# Patient Record
Sex: MALE | Employment: FULL TIME | URBAN - NONMETROPOLITAN AREA
[De-identification: names, ages, dates, MRNs, and addresses within clinical notes are randomized per-mention and may not be internally consistent; named-entity substitution may affect disease eponyms.]

---

## 2020-04-07 ENCOUNTER — APPOINTMENT (OUTPATIENT)
Dept: GENERAL RADIOLOGY | Facility: OTHER | Age: 40
End: 2020-04-07
Attending: PHYSICIAN ASSISTANT
Payer: COMMERCIAL

## 2020-04-07 ENCOUNTER — HOSPITAL ENCOUNTER (EMERGENCY)
Facility: OTHER | Age: 40
Discharge: HOME | End: 2020-04-07
Attending: PHYSICIAN ASSISTANT | Admitting: PHYSICIAN ASSISTANT
Payer: COMMERCIAL

## 2020-04-07 VITALS
TEMPERATURE: 98.1 F | BODY MASS INDEX: 26.37 KG/M2 | RESPIRATION RATE: 20 BRPM | SYSTOLIC BLOOD PRESSURE: 128 MMHG | HEART RATE: 94 BPM | HEIGHT: 67 IN | WEIGHT: 168 LBS | OXYGEN SATURATION: 98 % | DIASTOLIC BLOOD PRESSURE: 89 MMHG

## 2020-04-07 DIAGNOSIS — G44.1 VASCULAR HEADACHE: ICD-10-CM

## 2020-04-07 DIAGNOSIS — I10 HTN (HYPERTENSION): ICD-10-CM

## 2020-04-07 LAB
ALBUMIN SERPL-MCNC: 4.6 G/DL (ref 3.5–5.7)
ALP SERPL-CCNC: 52 U/L (ref 34–104)
ALT SERPL W P-5'-P-CCNC: 40 U/L (ref 7–52)
ANION GAP SERPL CALCULATED.3IONS-SCNC: 6 MMOL/L (ref 3–14)
AST SERPL W P-5'-P-CCNC: 24 U/L (ref 13–39)
BASOPHILS # BLD AUTO: 0 10E9/L (ref 0–0.2)
BASOPHILS NFR BLD AUTO: 0.3 %
BILIRUB SERPL-MCNC: 1.1 MG/DL (ref 0.3–1)
BUN SERPL-MCNC: 14 MG/DL (ref 7–25)
CALCIUM SERPL-MCNC: 10 MG/DL (ref 8.6–10.3)
CHLORIDE SERPL-SCNC: 103 MMOL/L (ref 98–107)
CO2 SERPL-SCNC: 27 MMOL/L (ref 21–31)
CREAT SERPL-MCNC: 1.05 MG/DL (ref 0.7–1.3)
D DIMER PPP DDU-MCNC: <200 NG/ML D-DU (ref 0–230)
DIFFERENTIAL METHOD BLD: NORMAL
EOSINOPHIL # BLD AUTO: 0.1 10E9/L (ref 0–0.7)
EOSINOPHIL NFR BLD AUTO: 0.7 %
ERYTHROCYTE [DISTWIDTH] IN BLOOD BY AUTOMATED COUNT: 12.7 % (ref 10–15)
GFR SERPL CREATININE-BSD FRML MDRD: 78 ML/MIN/{1.73_M2}
GLUCOSE SERPL-MCNC: 101 MG/DL (ref 70–105)
HCT VFR BLD AUTO: 44.2 % (ref 40–53)
HGB BLD-MCNC: 14.5 G/DL (ref 13.3–17.7)
IMM GRANULOCYTES # BLD: 0 10E9/L (ref 0–0.4)
IMM GRANULOCYTES NFR BLD: 0.4 %
INR PPP: 1.05 (ref 0–1.3)
LYMPHOCYTES # BLD AUTO: 1.1 10E9/L (ref 0.8–5.3)
LYMPHOCYTES NFR BLD AUTO: 11.9 %
MCH RBC QN AUTO: 29.1 PG (ref 26.5–33)
MCHC RBC AUTO-ENTMCNC: 32.8 G/DL (ref 31.5–36.5)
MCV RBC AUTO: 89 FL (ref 78–100)
MONOCYTES # BLD AUTO: 0.6 10E9/L (ref 0–1.3)
MONOCYTES NFR BLD AUTO: 6.2 %
NEUTROPHILS # BLD AUTO: 7.6 10E9/L (ref 1.6–8.3)
NEUTROPHILS NFR BLD AUTO: 80.5 %
NT-PROBNP SERPL-MCNC: 11 PG/ML (ref 0–100)
PLATELET # BLD AUTO: 267 10E9/L (ref 150–450)
POTASSIUM SERPL-SCNC: 3.7 MMOL/L (ref 3.5–5.1)
PROT SERPL-MCNC: 7.3 G/DL (ref 6.4–8.9)
RBC # BLD AUTO: 4.98 10E12/L (ref 4.4–5.9)
SODIUM SERPL-SCNC: 136 MMOL/L (ref 134–144)
TROPONIN I SERPL-MCNC: <2.3 PG/ML
TSH SERPL DL<=0.05 MIU/L-ACNC: 0.96 IU/ML (ref 0.34–5.6)
WBC # BLD AUTO: 9.5 10E9/L (ref 4–11)

## 2020-04-07 PROCEDURE — 84443 ASSAY THYROID STIM HORMONE: CPT | Performed by: PHYSICIAN ASSISTANT

## 2020-04-07 PROCEDURE — 80053 COMPREHEN METABOLIC PANEL: CPT | Performed by: PHYSICIAN ASSISTANT

## 2020-04-07 PROCEDURE — 85379 FIBRIN DEGRADATION QUANT: CPT | Performed by: PHYSICIAN ASSISTANT

## 2020-04-07 PROCEDURE — 93005 ELECTROCARDIOGRAM TRACING: CPT | Performed by: PHYSICIAN ASSISTANT

## 2020-04-07 PROCEDURE — 83880 ASSAY OF NATRIURETIC PEPTIDE: CPT | Performed by: PHYSICIAN ASSISTANT

## 2020-04-07 PROCEDURE — 84484 ASSAY OF TROPONIN QUANT: CPT | Performed by: PHYSICIAN ASSISTANT

## 2020-04-07 PROCEDURE — 93010 ELECTROCARDIOGRAM REPORT: CPT | Performed by: INTERNAL MEDICINE

## 2020-04-07 PROCEDURE — 71045 X-RAY EXAM CHEST 1 VIEW: CPT | Mod: TC

## 2020-04-07 PROCEDURE — 36415 COLL VENOUS BLD VENIPUNCTURE: CPT | Performed by: PHYSICIAN ASSISTANT

## 2020-04-07 PROCEDURE — 99285 EMERGENCY DEPT VISIT HI MDM: CPT | Mod: 25 | Performed by: PHYSICIAN ASSISTANT

## 2020-04-07 PROCEDURE — 25000132 ZZH RX MED GY IP 250 OP 250 PS 637: Performed by: PHYSICIAN ASSISTANT

## 2020-04-07 PROCEDURE — 85610 PROTHROMBIN TIME: CPT | Performed by: PHYSICIAN ASSISTANT

## 2020-04-07 PROCEDURE — 99284 EMERGENCY DEPT VISIT MOD MDM: CPT | Mod: Z6 | Performed by: PHYSICIAN ASSISTANT

## 2020-04-07 PROCEDURE — 85025 COMPLETE CBC W/AUTO DIFF WBC: CPT | Performed by: PHYSICIAN ASSISTANT

## 2020-04-07 RX ORDER — LISINOPRIL 10 MG/1
10 TABLET ORAL ONCE
Status: COMPLETED | OUTPATIENT
Start: 2020-04-07 | End: 2020-04-07

## 2020-04-07 RX ORDER — RIZATRIPTAN BENZOATE 10 MG/1
10 TABLET, ORALLY DISINTEGRATING ORAL
Status: COMPLETED | OUTPATIENT
Start: 2020-04-07 | End: 2020-04-07

## 2020-04-07 RX ORDER — RIZATRIPTAN BENZOATE 10 MG/1
TABLET ORAL
Qty: 30 TABLET | Refills: 0 | Status: SHIPPED | OUTPATIENT
Start: 2020-04-07

## 2020-04-07 RX ORDER — LISINOPRIL 10 MG/1
10 TABLET ORAL DAILY
Qty: 30 TABLET | Refills: 0 | Status: SHIPPED | OUTPATIENT
Start: 2020-04-07 | End: 2020-05-07

## 2020-04-07 RX ADMIN — RIZATRIPTAN BENZOATE 10 MG: 10 TABLET, ORALLY DISINTEGRATING ORAL at 22:39

## 2020-04-07 RX ADMIN — LISINOPRIL 10 MG: 10 TABLET ORAL at 22:39

## 2020-04-07 SDOH — HEALTH STABILITY: MENTAL HEALTH: HOW OFTEN DO YOU HAVE A DRINK CONTAINING ALCOHOL?: NEVER

## 2020-04-07 ASSESSMENT — MIFFLIN-ST. JEOR: SCORE: 1630.67

## 2020-04-07 ASSESSMENT — ENCOUNTER SYMPTOMS
BACK PAIN: 0
SEIZURES: 0
CONSTIPATION: 0
WEAKNESS: 0
APPETITE CHANGE: 0
NECK STIFFNESS: 0
FACIAL ASYMMETRY: 0
SORE THROAT: 0
DYSURIA: 0
SPEECH DIFFICULTY: 0
RHINORRHEA: 0
FACIAL SWELLING: 0
NAUSEA: 0
FATIGUE: 0
LIGHT-HEADEDNESS: 0
ACTIVITY CHANGE: 0
NECK PAIN: 0
TROUBLE SWALLOWING: 0
WHEEZING: 0
DIARRHEA: 0
STRIDOR: 0
DIZZINESS: 0
CHEST TIGHTNESS: 0
FREQUENCY: 0
TREMORS: 0
VOMITING: 0
EYE PAIN: 0
HEADACHES: 1
COLOR CHANGE: 0
SHORTNESS OF BREATH: 0
FEVER: 0
COUGH: 0
ABDOMINAL PAIN: 0

## 2020-04-07 NOTE — ED AVS SNAPSHOT
[facility]  [address]  [place] [address]  Phone:  [phone]  Fax:  [phone]                                    Matias Tinsley   MRN: [id number]    Department:  [facility]   Date of Visit:  4/7/2020           After Visit Summary Signature Page    I have received my discharge instructions, and my questions have been answered. I have discussed any challenges I see with this plan with the nurse or doctor.    ..........................................................................................................................................  Patient/Patient Representative Signature      ..........................................................................................................................................  Patient Representative Print Name and Relationship to Patient    ..................................................               ................................................  Date                                   Time    ..........................................................................................................................................  Reviewed by Signature/Title    ...................................................              ..............................................  Date                                               Time          22EPIC Rev 08/18

## 2020-04-08 NOTE — ED TRIAGE NOTES
"Presents to the ER d/t  the last three days the patient reports \"unstable blood pressure\".  Reports dizziness and a headache.  Felt worse if he bent down.  Denies being on any blood pressure medicine.  Pt is here from [place] and has been working here for 4 weeks.  Pt reports allergies, being in the cold seems to affect his breathing more.  Does not speak english.  We have provided an .   "

## 2020-04-08 NOTE — ED PROVIDER NOTES
History     Chief Complaint   Patient presents with     Hypertension     HPI  Matias Tinsley is a 39 year old male who resides in [place] but commutes appear for work.  He has had 3-day history of worsening blood pressures as well as headaches.  He reports when monitoring his blood pressures they have been in the 150/90 range.  He denies any chest pain or shortness of breath.  No lightheadedness but some dizziness at times.  Denies any sore throat cough or flulike symptoms.  Denies any nausea or vomiting.  His usual doctor is in [place].  He is here with an telemetry  for further evaluation.    Allergies:  Allergies   Allergen Reactions     Seasonal Allergies        Problem List:    There are no active problems to display for this patient.       Past Medical History:    History reviewed. No pertinent past medical history.    Past Surgical History:    History reviewed. No pertinent surgical history.    Family History:    History reviewed. No pertinent family history.    Social History:  Marital Status:   [2]  Social History     Tobacco Use     Smoking status: Never Smoker     Smokeless tobacco: Never Used   Substance Use Topics     Alcohol use: Not Currently     Frequency: Never     Drug use: Never        Medications:    No current outpatient medications on file.        Review of Systems   Constitutional: Negative for activity change, appetite change, fatigue and fever.   HENT: Negative for drooling, facial swelling, rhinorrhea, sore throat and trouble swallowing.    Eyes: Negative for pain and visual disturbance.   Respiratory: Negative for cough, chest tightness, shortness of breath, wheezing and stridor.    Cardiovascular: Negative for chest pain and leg swelling.   Gastrointestinal: Negative for abdominal pain, constipation, diarrhea, nausea and vomiting.   Genitourinary: Negative for dysuria, frequency and urgency.   Musculoskeletal: Negative for back pain, neck pain and neck stiffness.   Skin:  "Negative for color change.   Neurological: Positive for headaches. Negative for dizziness, tremors, seizures, facial asymmetry, speech difficulty, weakness and light-headedness.       Physical Exam   BP: (!) 144/87  Pulse: 94  Heart Rate: 104  Temp: 98.1  F (36.7  C)  Resp: 20  Height: 170.2 cm (5' 7\")  Weight: 76.2 kg (168 lb)  SpO2: 98 %      Physical Exam  Constitutional:       General: He is not in acute distress.     Appearance: He is not ill-appearing, toxic-appearing or diaphoretic.   HENT:      Head: Atraumatic.      Right Ear: Tympanic membrane normal. No drainage or tenderness.      Left Ear: Tympanic membrane normal. No drainage or tenderness.      Nose: Nose normal. No rhinorrhea.   Eyes:      General: No scleral icterus.     Extraocular Movements: Extraocular movements intact.      Right eye: Normal extraocular motion and no nystagmus.      Left eye: Normal extraocular motion and no nystagmus.      Pupils: Pupils are equal, round, and reactive to light. Pupils are equal.      Funduscopic exam:     Right eye: No AV nicking or papilledema. Red reflex present.         Left eye: No AV nicking or papilledema. Red reflex present.  Neck:      Musculoskeletal: Normal range of motion. Normal range of motion. No neck rigidity, pain with movement or muscular tenderness.      Vascular: No JVD.      Trachea: No tracheal deviation.   Cardiovascular:      Rate and Rhythm: Normal rate and regular rhythm.      Heart sounds: Normal heart sounds. No murmur. No gallop.    Pulmonary:      Effort: No respiratory distress.      Breath sounds: Normal breath sounds. No stridor.      Comments: Lung sounds are clear.  SaO2 is 90% on room air.  He is not appear to be in any respiratory distress.  Abdominal:      General: Bowel sounds are normal.      Palpations: Abdomen is soft.      Tenderness: There is no abdominal tenderness. There is no guarding or rebound.   Musculoskeletal: Normal range of motion.         General: No " tenderness.   Lymphadenopathy:      Cervical: No cervical adenopathy.      Right cervical: No superficial cervical adenopathy.     Left cervical: No superficial cervical adenopathy.   Skin:     General: Skin is warm.      Capillary Refill: Capillary refill takes less than 2 seconds.      Findings: No rash.   Neurological:      General: No focal deficit present.      Mental Status: He is alert and oriented to person, place, and time.         ED Course     EKG shows normal sinus rhythm with a heart rate of 88.  No previous EKG for comparison.    Results for orders placed or performed during the hospital encounter of 04/07/20 (from the past 24 hour(s))   CBC with platelets differential   Result Value Ref Range    WBC 9.5 4.0 - 11.0 10e9/L    RBC Count 4.98 4.4 - 5.9 10e12/L    Hemoglobin 14.5 13.3 - 17.7 g/dL    Hematocrit 44.2 40.0 - 53.0 %    MCV 89 78 - 100 fl    MCH 29.1 26.5 - 33.0 pg    MCHC 32.8 31.5 - 36.5 g/dL    RDW 12.7 10.0 - 15.0 %    Platelet Count 267 150 - 450 10e9/L    Diff Method Automated Method     % Neutrophils 80.5 %    % Lymphocytes 11.9 %    % Monocytes 6.2 %    % Eosinophils 0.7 %    % Basophils 0.3 %    % Immature Granulocytes 0.4 %    Absolute Neutrophil 7.6 1.6 - 8.3 10e9/L    Absolute Lymphocytes 1.1 0.8 - 5.3 10e9/L    Absolute Monocytes 0.6 0.0 - 1.3 10e9/L    Absolute Eosinophils 0.1 0.0 - 0.7 10e9/L    Absolute Basophils 0.0 0.0 - 0.2 10e9/L    Abs Immature Granulocytes 0.0 0 - 0.4 10e9/L   D-Dimer GH   Result Value Ref Range    D-Dimer ng/mL <200 0 - 230 ng/ml D-DU   INR   Result Value Ref Range    INR 1.05 0 - 1.3   Comprehensive metabolic panel   Result Value Ref Range    Sodium 136 134 - 144 mmol/L    Potassium 3.7 3.5 - 5.1 mmol/L    Chloride 103 98 - 107 mmol/L    Carbon Dioxide 27 21 - 31 mmol/L    Anion Gap 6 3 - 14 mmol/L    Glucose 101 70 - 105 mg/dL    Urea Nitrogen 14 7 - 25 mg/dL    Creatinine 1.05 0.70 - 1.30 mg/dL    GFR Estimate 78 >60 mL/min/[1.73_m2]    GFR Estimate If  Black >90 >60 mL/min/[1.73_m2]    Calcium 10.0 8.6 - 10.3 mg/dL    Bilirubin Total 1.1 (H) 0.3 - 1.0 mg/dL    Albumin 4.6 3.5 - 5.7 g/dL    Protein Total 7.3 6.4 - 8.9 g/dL    Alkaline Phosphatase 52 34 - 104 U/L    ALT 40 7 - 52 U/L    AST 24 13 - 39 U/L   Troponin GH   Result Value Ref Range    Troponin <2.3 <34.0 pg/mL   TSH Reflex GH   Result Value Ref Range    TSH Reflex 0.96 0.34 - 5.60 IU/mL   Nt probnp inpatient (BNP)   Result Value Ref Range    N-Terminal Pro BNP Inpatient 11 0 - 100 pg/mL   XR Chest Port 1 View    Narrative    PROCEDURE INFORMATION:   Exam: XR Chest, 1 View   Exam date and time: 4/7/2020 10:25 PM   Age: 39 years old   Clinical indication: Shortness of breath; Additional info: HTN     TECHNIQUE:   Imaging protocol: XR of the chest   Views: 1 view.     COMPARISON:   No relevant prior studies available.     FINDINGS:   Lungs: No acute focal dense air space consolidation or lung parenchymal mass.   Pleural space: No pneumothorax. No large right pleural effusion. No large left   pleural effusion.   Heart/Mediastinum: Unremarkable cardiac silhouette. No mediastinal mass.   Bones/joints: No acute fracture or neoplastic osseous lesion.       Impression    IMPRESSION:   1. No active cardiopulmonary disaese.   2. Remainder of findings described as above.     THIS DOCUMENT HAS BEEN ELECTRONICALLY SIGNED BY SASKIA MOSQUEDA MD       Medications   lisinopril (ZESTRIL) tablet 10 mg (10 mg Oral Given 4/7/20 2239)   rizatriptan (MAXALT-MLT) ODT tab 10 mg (10 mg Oral Given 4/7/20 2239)       Assessments & Plan (with Medical Decision Making)     I have reviewed the nursing notes.    I have reviewed the findings, diagnosis, plan and need for follow up with the patient.      New Prescriptions    LISINOPRIL (ZESTRIL) 10 MG TABLET    Take 1 tablet (10 mg) by mouth daily    RIZATRIPTAN (MAXALT) 10 MG TABLET    10 mg p.o. at onset of headache.  May repeat again in 2 hours as needed.  Maximum is 30 mg of Maxalt daily        Final diagnoses:   HTN (hypertension)   Vascular headache     Afebrile.  Vital signs stable but slightly elevated blood pressure 144/88.  Patient with noticing increasing hypertension as well as headaches.  He was given an oral lisinopril as well as Maxalt in the ER.  EKG shows normal sinus rhythm no previous EKG for comparison.  CBC is unremarkable.  CMP is normal.  His troponin is normal.  BNP is normal.  D-dimer is normal.  TSH is normal.  INR is normal.  Chest x-ray is unremarkable.  Gradually with time his blood pressure decreased acceptable levels 120s over 70s.  He is feeling much better after the above treatment.  He is not from the area but does see a primary care provider in [place] and he was encouraged to follow-up for further evaluation and continued hypertension management.  Follow-up sooner if there is any other concerns problems or questions.  4/7/2020     [facility]  Jonathan Muñiz PA-C  04/08/20 2178

## 2020-04-13 LAB — INTERPRETATION ECG - MUSE: NORMAL

## 2020-05-06 RX ORDER — LISINOPRIL 10 MG/1
TABLET ORAL
Qty: 30 TABLET | Refills: 0 | OUTPATIENT
Start: 2020-05-06

## 2020-05-06 NOTE — TELEPHONE ENCOUNTER
No PCP at [facility]  Patient noted to have appointment at [facility] with Radha Ellis RN on 5/6/2020 at 11:23 AM

## (undated) RX ORDER — RIZATRIPTAN BENZOATE 10 MG/1
TABLET, ORALLY DISINTEGRATING ORAL
Status: DISPENSED
Start: 2020-04-07

## (undated) RX ORDER — LISINOPRIL 10 MG/1
TABLET ORAL
Status: DISPENSED
Start: 2020-04-07